# Patient Record
(demographics unavailable — no encounter records)

---

## 2025-05-23 NOTE — PHYSICAL EXAM
[Appropriately responsive] : appropriately responsive [Alert] : alert [No Acute Distress] : no acute distress [Labia Majora] : normal [Labia Minora] : normal [Normal] : normal [FreeTextEntry5] : IUD string not visualized

## 2025-05-23 NOTE — PLAN
[FreeTextEntry1] : 29 y/o female presenting with concerns of amenorrhea in the setting of her Mirena IUD.   #Amenorrhea - Reassured patient that amenorrhea is a common occurrence with a Mirena IUD - Cyclical pelvic cramping likely due to patient's normal cycle even in the setting of amenorrhea  - Advised NSAIDs for cramping if needed - Patient would like to continue with Mirena IUD   #Nausea  - Etiology not likely due to IUD - Patient with PCP, advised to follow up with PCP   RTC PRN  mohinder Giang, PGY-1

## 2025-05-23 NOTE — HISTORY OF PRESENT ILLNESS
[FreeTextEntry1] : 33 y/o P2 LMP in March with h/o Mirena IUD placed in 2024 presenting for concerns regarding amenorrhea and lower abdominal cramping.  Patient previously had a Paraguard IUD, which was subsequently switched to a Mirena IUD due to irregular bleeding and spotting. The patient has been amenorrheic for a "few months". The patient's abdominal cramping is cyclic and occurs at the same time monthly.   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 Denies using harsh chemicals.

## 2025-05-23 NOTE — REASON FOR VISIT
[Follow-Up] : a follow-up evaluation of [Time Spent: ____ minutes] : Total time spent using  services: [unfilled] minutes. The patient's primary language is not English thus required  services. [Interpreters_IDNumber] : [id number] [Interpreters_FullName] : Gulshan

## 2025-05-23 NOTE — REASON FOR VISIT
[Follow-Up] : a follow-up evaluation of [Time Spent: ____ minutes] : Total time spent using  services: [unfilled] minutes. The patient's primary language is not English thus required  services. [Interpreters_IDNumber] : [id number] [Interpreters_FullName] : Gulshan

## 2025-05-29 NOTE — PHYSICAL EXAM
[Normal] : normal rate, regular rhythm, normal S1 and S2 and no murmur heard [Soft] : abdomen soft [Non Tender] : non-tender [Non-distended] : non-distended [No Focal Deficits] : no focal deficits

## 2025-05-30 NOTE — HISTORY OF PRESENT ILLNESS
[FreeTextEntry1] : follow up [de-identified] : 34F with history of HLD, prediabetes, obesity and presence of Mirena IUD (placed in 2024) presents for nausea. Pt recently seen by OBGYN, then she had complaints of nausea. Today she denies acute nausea, reports since mirena pt has experienced intermittent mild nausea as well as intermittent colicky pain and abdominal distension.  Denies headache, vision changes, abdominal pain, constipation, BRBPR LMP 3/25 Prior to Mirena pt used Darek IUD- used to get montlhy menses lasting 10-14d.

## 2025-05-30 NOTE — END OF VISIT
[] : Resident PAST SURGICAL HISTORY:  Cataract of both eyes     S/P appendectomy     S/P brain surgery     S/P hysterectomy     S/P tonsillectomy      [FreeTextEntry3] : Case discussed with Dr. Dubon. Agree with conservative measure to promote gut motility and avoid constipation. RTC as needed PAST SURGICAL HISTORY:  Cataract of both eyes     S/P appendectomy     S/P brain surgery     S/P hysterectomy     S/P tonsillectomy      PAST SURGICAL HISTORY:  Cataract of both eyes     S/P appendectomy     S/P brain surgery     S/P hysterectomy     S/P tonsillectomy      Patient declined information

## 2025-05-30 NOTE — HISTORY OF PRESENT ILLNESS
[FreeTextEntry1] : follow up [de-identified] : 34F with history of HLD, prediabetes, obesity and presence of Mirena IUD (placed in 2024) presents for nausea. Pt recently seen by OBGYN, then she had complaints of nausea. Today she denies acute nausea, reports since mirena pt has experienced intermittent mild nausea as well as intermittent colicky pain and abdominal distension.  Denies headache, vision changes, abdominal pain, constipation, BRBPR LMP 3/25 Prior to Mirena pt used Darek IUD- used to get montlhy menses lasting 10-14d.

## 2025-05-30 NOTE — END OF VISIT
[] : Resident [FreeTextEntry3] : Case discussed with Dr. Dubon. Agree with conservative measure to promote gut motility and avoid constipation. RTC as needed